# Patient Record
Sex: FEMALE | Race: WHITE | HISPANIC OR LATINO
[De-identification: names, ages, dates, MRNs, and addresses within clinical notes are randomized per-mention and may not be internally consistent; named-entity substitution may affect disease eponyms.]

---

## 2020-07-26 ENCOUNTER — TRANSCRIPTION ENCOUNTER (OUTPATIENT)
Age: 20
End: 2020-07-26

## 2021-01-22 ENCOUNTER — TRANSCRIPTION ENCOUNTER (OUTPATIENT)
Age: 21
End: 2021-01-22

## 2021-09-29 ENCOUNTER — TRANSCRIPTION ENCOUNTER (OUTPATIENT)
Age: 21
End: 2021-09-29

## 2022-02-19 ENCOUNTER — TRANSCRIPTION ENCOUNTER (OUTPATIENT)
Age: 22
End: 2022-02-19

## 2023-02-13 ENCOUNTER — APPOINTMENT (OUTPATIENT)
Dept: RADIOLOGY | Facility: CLINIC | Age: 23
End: 2023-02-13
Payer: MEDICAID

## 2023-02-13 PROCEDURE — 73564 X-RAY EXAM KNEE 4 OR MORE: CPT | Mod: RT

## 2023-07-10 PROBLEM — Z00.00 ENCOUNTER FOR PREVENTIVE HEALTH EXAMINATION: Status: ACTIVE | Noted: 2023-07-10

## 2023-07-26 ENCOUNTER — APPOINTMENT (OUTPATIENT)
Dept: OBGYN | Facility: CLINIC | Age: 23
End: 2023-07-26
Payer: MEDICAID

## 2023-07-26 VITALS
WEIGHT: 170 LBS | HEIGHT: 61 IN | SYSTOLIC BLOOD PRESSURE: 120 MMHG | BODY MASS INDEX: 32.1 KG/M2 | DIASTOLIC BLOOD PRESSURE: 76 MMHG

## 2023-07-26 DIAGNOSIS — Z01.419 ENCOUNTER FOR GYNECOLOGICAL EXAMINATION (GENERAL) (ROUTINE) W/OUT ABNORMAL FINDINGS: ICD-10-CM

## 2023-07-26 DIAGNOSIS — Z87.42 PERSONAL HISTORY OF OTHER DISEASES OF THE FEMALE GENITAL TRACT: ICD-10-CM

## 2023-07-26 PROCEDURE — 90651 9VHPV VACCINE 2/3 DOSE IM: CPT

## 2023-07-26 PROCEDURE — 90471 IMMUNIZATION ADMIN: CPT

## 2023-07-26 PROCEDURE — 99385 PREV VISIT NEW AGE 18-39: CPT | Mod: 25

## 2023-07-27 LAB
C TRACH RRNA SPEC QL NAA+PROBE: NOT DETECTED
N GONORRHOEA RRNA SPEC QL NAA+PROBE: NOT DETECTED
SOURCE AMPLIFICATION: NORMAL

## 2023-07-27 NOTE — PLAN
[FreeTextEntry1] : Normal CBE and SBE taught and encouraged to perform monthly\par Normal pelvic exam\par Pap collected\par GC/CT collected\par Safe sex discussed and patient uses condoms\par Gardisil vaccination discussed and first vaccine given today in left deltoid. Lot# [id number], Exp: 10/19/24 - patient observed for 15 minutes and tolerated well\par Healthy diet,exercise and sleep hygiene discussed\par RTO for second Gardisil vaccine in two months\par Consider restarting OCPs for management of PCOS

## 2023-07-27 NOTE — HISTORY OF PRESENT ILLNESS
[TextBox_4] : 21 yo G0 presents for well woman exam today.  She is sexually active and uses condoms only for contraception. Hx PCOS and followed by endocrine previously.  Was put on Metformin and d/c'd as she found it ineffective.  Was started on OCPs but d/c'd due to weight gain.

## 2023-07-27 NOTE — PLAN
[FreeTextEntry1] : Normal CBE and SBE taught and encouraged to perform monthly\par Normal pelvic exam\par Pap collected\par GC/CT collected\par Safe sex discussed and patient uses condoms\par Gardisil vaccination discussed and first vaccine given today in left deltoid. Lot# [id number], Exp: 10/19/24 - patient observed for 15 minutes and tolerated well\par Healthy diet,exercise and sleep hygiene discussed\par RTO for second Gardisil vaccine in two months\par Consider restarting OCPs for management of PCOS

## 2023-07-27 NOTE — PHYSICAL EXAM
[Appropriately responsive] : appropriately responsive [Alert] : alert [No Acute Distress] : no acute distress [No Lymphadenopathy] : no lymphadenopathy [Regular Rate Rhythm] : regular rate rhythm [No Murmurs] : no murmurs [Clear to Auscultation B/L] : clear to auscultation bilaterally [Soft] : soft [Non-tender] : non-tender [Non-distended] : non-distended [No HSM] : No HSM [No Lesions] : no lesions [No Mass] : no mass [Oriented x3] : oriented x3 [Examination Of The Breasts] : a normal appearance [No Masses] : no breast masses were palpable [Labia Majora] : normal [Labia Minora] : normal [Normal] : normal [Uterine Adnexae] : normal [FreeTextEntry2] : Acanthosis nigricans  [FreeTextEntry7] : Acanthosis nigricans  noted on neck, thighs, underarms. Hirsutism thighs and abdomen

## 2023-07-27 NOTE — HISTORY OF PRESENT ILLNESS
[TextBox_4] : 23 yo G0 presents for well woman exam today.  She is sexually active and uses condoms only for contraception. Hx PCOS and followed by endocrine previously.  Was put on Metformin and d/c'd as she found it ineffective.  Was started on OCPs but d/c'd due to weight gain.

## 2023-08-02 LAB — CYTOLOGY CVX/VAG DOC THIN PREP: NORMAL

## 2023-08-06 ENCOUNTER — NON-APPOINTMENT (OUTPATIENT)
Age: 23
End: 2023-08-06

## 2023-10-02 ENCOUNTER — APPOINTMENT (OUTPATIENT)
Dept: OBGYN | Facility: CLINIC | Age: 23
End: 2023-10-02
Payer: MEDICAID

## 2023-10-02 DIAGNOSIS — Z23 ENCOUNTER FOR IMMUNIZATION: ICD-10-CM

## 2023-10-02 PROCEDURE — 90471 IMMUNIZATION ADMIN: CPT

## 2023-10-02 PROCEDURE — 90651 9VHPV VACCINE 2/3 DOSE IM: CPT

## 2024-02-05 ENCOUNTER — APPOINTMENT (OUTPATIENT)
Dept: OBGYN | Facility: CLINIC | Age: 24
End: 2024-02-05

## 2024-08-28 ENCOUNTER — APPOINTMENT (OUTPATIENT)
Dept: ULTRASOUND IMAGING | Facility: CLINIC | Age: 24
End: 2024-08-28
Payer: COMMERCIAL

## 2024-08-28 PROCEDURE — 76705 ECHO EXAM OF ABDOMEN: CPT

## 2024-10-14 ENCOUNTER — APPOINTMENT (OUTPATIENT)
Dept: CT IMAGING | Facility: CLINIC | Age: 24
End: 2024-10-14
Payer: COMMERCIAL

## 2024-10-14 PROCEDURE — 74177 CT ABD & PELVIS W/CONTRAST: CPT

## 2024-11-26 ENCOUNTER — APPOINTMENT (OUTPATIENT)
Dept: OBGYN | Facility: CLINIC | Age: 24
End: 2024-11-26
Payer: COMMERCIAL

## 2024-11-26 VITALS
WEIGHT: 136 LBS | SYSTOLIC BLOOD PRESSURE: 106 MMHG | HEIGHT: 61 IN | BODY MASS INDEX: 25.68 KG/M2 | DIASTOLIC BLOOD PRESSURE: 74 MMHG

## 2024-11-26 DIAGNOSIS — Z01.419 ENCOUNTER FOR GYNECOLOGICAL EXAMINATION (GENERAL) (ROUTINE) W/OUT ABNORMAL FINDINGS: ICD-10-CM

## 2024-11-26 DIAGNOSIS — Z30.09 ENCOUNTER FOR OTHER GENERAL COUNSELING AND ADVICE ON CONTRACEPTION: ICD-10-CM

## 2024-11-26 DIAGNOSIS — Z23 ENCOUNTER FOR IMMUNIZATION: ICD-10-CM

## 2024-11-26 PROCEDURE — 99212 OFFICE O/P EST SF 10 MIN: CPT | Mod: 25

## 2024-11-26 PROCEDURE — 99395 PREV VISIT EST AGE 18-39: CPT

## 2024-11-27 LAB
C TRACH RRNA SPEC QL NAA+PROBE: NOT DETECTED
N GONORRHOEA RRNA SPEC QL NAA+PROBE: NOT DETECTED
SOURCE TP AMPLIFICATION: NORMAL

## 2024-12-01 LAB — CYTOLOGY CVX/VAG DOC THIN PREP: NORMAL
